# Patient Record
Sex: FEMALE | Race: WHITE | NOT HISPANIC OR LATINO | URBAN - METROPOLITAN AREA
[De-identification: names, ages, dates, MRNs, and addresses within clinical notes are randomized per-mention and may not be internally consistent; named-entity substitution may affect disease eponyms.]

---

## 2019-11-06 PROBLEM — 724538004: Status: ACTIVE | Noted: 2019-11-06

## 2020-07-28 ENCOUNTER — TELEPHONE ENCOUNTER (OUTPATIENT)
Dept: URBAN - METROPOLITAN AREA CLINIC 35 | Facility: CLINIC | Age: 75
End: 2020-07-28

## 2020-07-28 RX ORDER — ESOMEPRAZOLE MAGNESIUM 40 MG/1
TAKE ONE CAPSULE BY MOUTH EVERY DAY CAPSULE, DELAYED RELEASE ORAL DAILY
Qty: 90 | Refills: 0 | OUTPATIENT

## 2020-11-02 ENCOUNTER — TELEPHONE ENCOUNTER (OUTPATIENT)
Dept: URBAN - METROPOLITAN AREA CLINIC 35 | Facility: CLINIC | Age: 75
End: 2020-11-02

## 2020-11-02 RX ORDER — ESOMEPRAZOLE MAGNESIUM 40 MG/1
TAKE ONE CAPSULE BY MOUTH EVERY DAY CAPSULE, DELAYED RELEASE ORAL DAILY
Qty: 90 | Refills: 0 | OUTPATIENT

## 2021-02-05 ENCOUNTER — TELEPHONE ENCOUNTER (OUTPATIENT)
Dept: URBAN - METROPOLITAN AREA CLINIC 35 | Facility: CLINIC | Age: 76
End: 2021-02-05

## 2021-02-09 ENCOUNTER — TELEPHONE ENCOUNTER (OUTPATIENT)
Dept: URBAN - METROPOLITAN AREA CLINIC 35 | Facility: CLINIC | Age: 76
End: 2021-02-09

## 2021-02-15 ENCOUNTER — WEB ENCOUNTER (OUTPATIENT)
Dept: URBAN - METROPOLITAN AREA CLINIC 35 | Facility: CLINIC | Age: 76
End: 2021-02-15

## 2021-02-17 ENCOUNTER — OFFICE VISIT (OUTPATIENT)
Dept: URBAN - METROPOLITAN AREA CLINIC 31 | Facility: CLINIC | Age: 76
End: 2021-02-17

## 2021-02-17 VITALS — WEIGHT: 139 LBS | HEIGHT: 60 IN | BODY MASS INDEX: 27.29 KG/M2

## 2021-02-17 RX ORDER — ESOMEPRAZOLE MAGNESIUM 40 MG/1
TAKE ONE CAPSULE BY MOUTH EVERY DAY CAPSULE, DELAYED RELEASE ORAL DAILY
Qty: 90 | Refills: 0 | Status: ACTIVE | COMMUNITY

## 2021-02-17 RX ORDER — UBIDECARENONE/VIT E ACET 100MG-5
1 CAPSULE WITH A MEAL CAPSULE ORAL ONCE A DAY
Status: ACTIVE | COMMUNITY

## 2021-02-17 RX ORDER — DAPSONE 50 MG/G
GEL TOPICAL ONCE A DAY
Status: ACTIVE | COMMUNITY

## 2021-02-17 RX ORDER — ESOMEPRAZOLE MAGNESIUM 40 MG/1
TAKE ONE CAPSULE BY MOUTH EVERY DAY CAPSULE, DELAYED RELEASE ORAL DAILY
Qty: 90 TABLET | Refills: 4

## 2021-02-17 NOTE — HPI-MIGRATED HPI
;     Heartburn : Patient presents today for a follow-up office visit from 11/06/2019. She is currently taking Esomeprazole 40 mg, daily. Patient states that once in a while she may miss a dose of the Nexium. On the days that she misses a dose of the Nexium, she will experience heartburn. She denies any dysphagia episodes. Only current cough appears to be related to sinus congestion.  Prior reflux cough related to reflux is improved.  She is able to tell the difference.   Patient denies any nausea or vomiting. Patient states that today's visit is really for medication refills. Patient requests refills of Nexium 40 mg, daily, in a 90 day supply, sent to confirmed pharmacy in EMR.;

## 2024-02-12 ENCOUNTER — OV NP (OUTPATIENT)
Dept: URBAN - METROPOLITAN AREA CLINIC 35 | Facility: CLINIC | Age: 79
End: 2024-02-12
Payer: MEDICARE

## 2024-02-12 ENCOUNTER — OV NP (OUTPATIENT)
Dept: URBAN - METROPOLITAN AREA CLINIC 35 | Facility: CLINIC | Age: 79
End: 2024-02-12

## 2024-02-12 VITALS
SYSTOLIC BLOOD PRESSURE: 122 MMHG | BODY MASS INDEX: 24.35 KG/M2 | WEIGHT: 124 LBS | HEIGHT: 60 IN | DIASTOLIC BLOOD PRESSURE: 76 MMHG

## 2024-02-12 DIAGNOSIS — K29.30 CHRONIC SUPERFICIAL GASTRITIS WITHOUT BLEEDING: ICD-10-CM

## 2024-02-12 DIAGNOSIS — Z79.1 NSAID LONG-TERM USE: ICD-10-CM

## 2024-02-12 DIAGNOSIS — R12 HEARTBURN: ICD-10-CM

## 2024-02-12 PROBLEM — 196735001: Status: ACTIVE | Noted: 2024-02-12

## 2024-02-12 PROCEDURE — 99214 OFFICE O/P EST MOD 30 MIN: CPT | Performed by: PHYSICIAN ASSISTANT

## 2024-02-12 RX ORDER — FAMOTIDINE 40 MG/1
1 TABLET AT BEDTIME TABLET, FILM COATED ORAL ONCE A DAY
Status: ACTIVE | COMMUNITY

## 2024-02-12 RX ORDER — DAPSONE 50 MG/G
GEL TOPICAL ONCE A DAY
Status: ON HOLD | COMMUNITY

## 2024-02-12 RX ORDER — ESOMEPRAZOLE MAGNESIUM 40 MG/1
TAKE ONE CAPSULE BY MOUTH EVERY DAY CAPSULE, DELAYED RELEASE ORAL DAILY
Qty: 90 TABLET | Refills: 4 | Status: ACTIVE | COMMUNITY

## 2024-02-12 RX ORDER — PANTOPRAZOLE SODIUM 40 MG/1
1 TABLET TABLET, DELAYED RELEASE ORAL ONCE A DAY
Qty: 30 | Refills: 3 | OUTPATIENT
Start: 2024-02-12

## 2024-02-12 RX ORDER — TRAMADOL HYDROCHLORIDE 50 MG/1
1 TABLET AS NEEDED TABLET, FILM COATED ORAL ONCE A DAY
Status: ACTIVE | COMMUNITY

## 2024-02-12 RX ORDER — TIZANIDINE 2 MG/1
1 TABLET AS NEEDED TABLET ORAL THREE TIMES A DAY
Status: ACTIVE | COMMUNITY

## 2024-02-12 RX ORDER — UBIDECARENONE/VIT E ACET 100MG-5
1 CAPSULE WITH A MEAL CAPSULE ORAL ONCE A DAY
Status: ACTIVE | COMMUNITY

## 2024-02-12 RX ORDER — DAPSONE/NIACINAMIDE 8.5 %-4 %
AS DIRECTED GEL (GRAM) TOPICAL
Status: ACTIVE | COMMUNITY

## 2024-02-12 RX ORDER — GABAPENTIN 100 MG/1
1 CAPSULE CAPSULE ORAL ONCE A DAY
Status: ACTIVE | COMMUNITY

## 2024-02-12 NOTE — HPI-HEARTBURN
79 year old female patient presents today for mid abdominal burning. Onset was about 5 months ago. She admits she had back surgery and has been put on new medications which she believe may have caused her symtoms. She was placed on Diclofenac, and wasn't eating when she has been taking this medication for the past 5 weeks. She admits an EGD in the past, 04/2011 with Dr. Armendariz.  She was told she had chronic inactive gastritis, and at the time had been taking a lot of anti-inflammatories. She has been on Esomeprazole 40mg qAM, and added Famotidine 40mg BID. She has stopped her Diclofenac, but now started Aleve 220mg once every morning and once at night, and has been taking it for years, other than while on Diclofenac.  No nausea, emesis, no blood in stool.

## 2024-03-11 ENCOUNTER — OV EP (OUTPATIENT)
Dept: URBAN - METROPOLITAN AREA CLINIC 35 | Facility: CLINIC | Age: 79
End: 2024-03-11
Payer: MEDICARE

## 2024-03-11 VITALS
HEIGHT: 60 IN | SYSTOLIC BLOOD PRESSURE: 124 MMHG | DIASTOLIC BLOOD PRESSURE: 82 MMHG | WEIGHT: 124 LBS | BODY MASS INDEX: 24.35 KG/M2

## 2024-03-11 DIAGNOSIS — R12 HEARTBURN: ICD-10-CM

## 2024-03-11 DIAGNOSIS — K29.30 CHRONIC SUPERFICIAL GASTRITIS WITHOUT BLEEDING: ICD-10-CM

## 2024-03-11 DIAGNOSIS — F41.9 ANXIETY: ICD-10-CM

## 2024-03-11 DIAGNOSIS — Z79.1 NSAID LONG-TERM USE: ICD-10-CM

## 2024-03-11 PROBLEM — 48694002: Status: ACTIVE | Noted: 2024-03-11

## 2024-03-11 PROCEDURE — 99214 OFFICE O/P EST MOD 30 MIN: CPT | Performed by: PHYSICIAN ASSISTANT

## 2024-03-11 RX ORDER — DAPSONE 50 MG/G
GEL TOPICAL ONCE A DAY
Status: ON HOLD | COMMUNITY

## 2024-03-11 RX ORDER — PANTOPRAZOLE SODIUM 40 MG/1
1 TABLET TABLET, DELAYED RELEASE ORAL ONCE A DAY
Qty: 30 | Refills: 3 | OUTPATIENT

## 2024-03-11 RX ORDER — GABAPENTIN 100 MG/1
1 CAPSULE CAPSULE ORAL ONCE A DAY
Status: ACTIVE | COMMUNITY

## 2024-03-11 RX ORDER — AMITRIPTYLINE HYDROCHLORIDE 10 MG/1
1 TABLET AT BEDTIME TABLET, FILM COATED ORAL ONCE A DAY
Qty: 30 | Refills: 3 | OUTPATIENT
Start: 2024-03-11

## 2024-03-11 RX ORDER — TRAMADOL HYDROCHLORIDE 50 MG/1
1 TABLET AS NEEDED TABLET, FILM COATED ORAL ONCE A DAY
Status: ON HOLD | COMMUNITY

## 2024-03-11 RX ORDER — ESOMEPRAZOLE MAGNESIUM 40 MG/1
TAKE ONE CAPSULE BY MOUTH EVERY DAY CAPSULE, DELAYED RELEASE ORAL DAILY
Qty: 90 TABLET | Refills: 4 | Status: ON HOLD | COMMUNITY

## 2024-03-11 RX ORDER — FAMOTIDINE 40 MG/1
1 TABLET AT BEDTIME TABLET, FILM COATED ORAL ONCE A DAY
Qty: 90 TABLET | Refills: 3 | OUTPATIENT
Start: 2024-03-11

## 2024-03-11 RX ORDER — DAPSONE/NIACINAMIDE 8.5 %-4 %
AS DIRECTED GEL (GRAM) TOPICAL
Status: ACTIVE | COMMUNITY

## 2024-03-11 RX ORDER — FAMOTIDINE 40 MG/1
1 TABLET AT BEDTIME TABLET, FILM COATED ORAL ONCE A DAY
Status: ACTIVE | COMMUNITY

## 2024-03-11 RX ORDER — UBIDECARENONE/VIT E ACET 100MG-5
1 CAPSULE WITH A MEAL CAPSULE ORAL ONCE A DAY
Status: ACTIVE | COMMUNITY

## 2024-03-11 RX ORDER — PANTOPRAZOLE SODIUM 40 MG/1
1 TABLET TABLET, DELAYED RELEASE ORAL ONCE A DAY
Qty: 30 | Refills: 3 | Status: ACTIVE | COMMUNITY
Start: 2024-02-12

## 2024-03-11 RX ORDER — TIZANIDINE 2 MG/1
1 TABLET AS NEEDED TABLET ORAL THREE TIMES A DAY
Status: ON HOLD | COMMUNITY

## 2024-03-11 NOTE — HPI-HEARTBURN
Patient presents today for a follow up of abdominal burning. She admits taking Pantoprazole 40mg and Famotidine 40mg at night. She admits continued episodes since her last visit with some improvement. She admits symptoms are very slight. She admits to belching which is not normal for her. She admits being cautious of what she eats so she feels that might be helping as well.  Last visit: 79 year old female patient presents today for mid abdominal burning. Onset was about 5 months ago. She admits she had back surgery and has been put on new medications which she believe may have caused her symtoms. She was placed on Diclofenac, and wasn't eating when she has been taking this medication for the past 5 weeks. She admits an EGD in the past, 04/2011 with Dr. Armendariz.  She was told she had chronic inactive gastritis, and at the time had been taking a lot of anti-inflammatories. She has been on Esomeprazole 40mg qAM, and added Famotidine 40mg BID. She has stopped her Diclofenac, but now started Aleve 220mg once every morning and once at night, and has been taking it for years, other than while on Diclofenac.  No nausea, emesis, no blood in stool.

## 2024-04-11 ENCOUNTER — OV EP (OUTPATIENT)
Dept: URBAN - METROPOLITAN AREA CLINIC 35 | Facility: CLINIC | Age: 79
End: 2024-04-11

## 2024-04-15 ENCOUNTER — OV EP (OUTPATIENT)
Dept: URBAN - METROPOLITAN AREA CLINIC 35 | Facility: CLINIC | Age: 79
End: 2024-04-15
Payer: MEDICARE

## 2024-04-15 VITALS
DIASTOLIC BLOOD PRESSURE: 80 MMHG | WEIGHT: 124 LBS | BODY MASS INDEX: 24.35 KG/M2 | SYSTOLIC BLOOD PRESSURE: 118 MMHG | HEIGHT: 60 IN

## 2024-04-15 DIAGNOSIS — Z79.1 NSAID LONG-TERM USE: ICD-10-CM

## 2024-04-15 DIAGNOSIS — F41.9 ANXIETY: ICD-10-CM

## 2024-04-15 DIAGNOSIS — K29.30 CHRONIC SUPERFICIAL GASTRITIS WITHOUT BLEEDING: ICD-10-CM

## 2024-04-15 DIAGNOSIS — R12 HEARTBURN: ICD-10-CM

## 2024-04-15 PROCEDURE — 99213 OFFICE O/P EST LOW 20 MIN: CPT | Performed by: PHYSICIAN ASSISTANT

## 2024-04-15 RX ORDER — FAMOTIDINE 40 MG/1
1 TABLET AT BEDTIME TABLET, FILM COATED ORAL ONCE A DAY
Qty: 90 TABLET | Refills: 3 | Status: ACTIVE | COMMUNITY
Start: 2024-03-11

## 2024-04-15 RX ORDER — PANTOPRAZOLE SODIUM 40 MG/1
1 TABLET TABLET, DELAYED RELEASE ORAL ONCE A DAY
Qty: 30 | Refills: 3 | Status: ACTIVE | COMMUNITY

## 2024-04-15 RX ORDER — DAPSONE/NIACINAMIDE 8.5 %-4 %
AS DIRECTED GEL (GRAM) TOPICAL
Status: ACTIVE | COMMUNITY

## 2024-04-15 RX ORDER — FAMOTIDINE 40 MG/1
1 TABLET AT BEDTIME TABLET, FILM COATED ORAL ONCE A DAY
Qty: 90 TABLET | Refills: 3 | OUTPATIENT

## 2024-04-15 RX ORDER — GABAPENTIN 100 MG/1
1 CAPSULE CAPSULE ORAL ONCE A DAY
Status: ACTIVE | COMMUNITY

## 2024-04-15 RX ORDER — PANTOPRAZOLE SODIUM 40 MG/1
1 TABLET TABLET, DELAYED RELEASE ORAL ONCE A DAY
Qty: 30 | Refills: 3 | OUTPATIENT

## 2024-04-15 NOTE — HPI-HEARTBURN
79 year old female patient presents today for a follow up of abdominal burning. She admits continued use of Pantoprazole 40mg daily and Famotidine at night. She admits using Tylenol for pain management. She admits significant improvement in symptoms. She admits eating [place] or [place] will aggravate her symptoms. She finds diet control is her best asset to helping symptoms. She took Amitiptyline once but it didn't make her feel good so she didn't try again.  Last visit: Patient presents today for a follow up of abdominal burning. She admits taking Pantoprazole 40mg and Famotidine 40mg at night. She admits continued episodes since her last visit with some improvement. She admits symptoms are very slight. She admits to belching which is not normal for her. She admits being cautious of what she eats so she feels that might be helping as well.  Last visit: 79 year old female patient presents today for mid abdominal burning. Onset was about 5 months ago. She admits she had back surgery and has been put on new medications which she believe may have caused her symtoms. She was placed on Diclofenac, and wasn't eating when she has been taking this medication for the past 5 weeks. She admits an EGD in the past, 04/2011 with Dr. Armendariz.  She was told she had chronic inactive gastritis, and at the time had been taking a lot of anti-inflammatories. She has been on Esomeprazole 40mg qAM, and added Famotidine 40mg BID. She has stopped her Diclofenac, but now started Aleve 220mg once every morning and once at night, and has been taking it for years, other than while on Diclofenac.  No nausea, emesis, no blood in stool.

## 2024-07-22 ENCOUNTER — OFFICE VISIT (OUTPATIENT)
Dept: URBAN - METROPOLITAN AREA CLINIC 35 | Facility: CLINIC | Age: 79
End: 2024-07-22
Payer: MEDICARE

## 2024-07-22 ENCOUNTER — DASHBOARD ENCOUNTERS (OUTPATIENT)
Age: 79
End: 2024-07-22

## 2024-07-22 VITALS
BODY MASS INDEX: 23.95 KG/M2 | WEIGHT: 122 LBS | HEIGHT: 60 IN | SYSTOLIC BLOOD PRESSURE: 118 MMHG | DIASTOLIC BLOOD PRESSURE: 82 MMHG

## 2024-07-22 DIAGNOSIS — R12 HEARTBURN: ICD-10-CM

## 2024-07-22 PROCEDURE — 99214 OFFICE O/P EST MOD 30 MIN: CPT | Performed by: PHYSICIAN ASSISTANT

## 2024-07-22 RX ORDER — DAPSONE/NIACINAMIDE 8.5 %-4 %
AS DIRECTED GEL (GRAM) TOPICAL
Status: ACTIVE | COMMUNITY

## 2024-07-22 RX ORDER — PANTOPRAZOLE SODIUM 40 MG/1
1 TABLET TABLET, DELAYED RELEASE ORAL ONCE A DAY
Qty: 30 | Refills: 3 | Status: ON HOLD | COMMUNITY

## 2024-07-22 RX ORDER — FAMOTIDINE 40 MG/1
1 TABLET AT BEDTIME TABLET, FILM COATED ORAL ONCE A DAY
Qty: 90 TABLET | Refills: 3 | Status: ACTIVE | COMMUNITY

## 2024-07-22 RX ORDER — FAMOTIDINE 20 MG/1
TAKE 1 TABLET TABLET, FILM COATED ORAL
Qty: 60 TABLET | Refills: 3 | OUTPATIENT
Start: 2024-07-22

## 2024-07-22 RX ORDER — GABAPENTIN 100 MG/1
1 CAPSULE CAPSULE ORAL ONCE A DAY
Status: ACTIVE | COMMUNITY

## 2024-07-22 NOTE — HPI-HEARTBURN
Patient presents today for a follow up of abdominal burning. She denies continued abdominal burning and admits it is under control. She admits continued use of Pantoprazole and admits continued use of Famotidine. She admits she ran out of Pantoprazole. She admits she believes Pantoprazole works better than Famotidine. She admits at night her symptoms are worse when she takes Famotidine.  She tried to taper off the Pantorprazole but was not able to do so. She felt the Famotidine is inconsistent as far as how well it works. Pt admits she is having burning occasionally at night when she lays down.  Last visit: 79 year old female patient presents today for a follow up of abdominal burning. She admits continued use of Pantoprazole 40mg daily and Famotidine at night. She admits using Tylenol for pain management. She admits significant improvement in symptoms. She admits eating [place] or [place] will aggravate her symptoms. She finds diet control is her best asset to helping symptoms. She took Amitriptyline oncebut it didn't make her feel goodso she didn't try again.  Last visit: Patient presents today for a follow up of abdominal burning. She admits taking Pantoprazole 40mg and Famotidine 40mg at night. She admits continued episodes since her last visit with some improvement. She admits symptoms are very slight. She admits to belching which is not normal for her. She admits being cautious of what she eatsso she feels that might be helping as well.  Last visit: 79 year old female patient presents today for mid abdominal burning. Onset was about 5 months ago. She admits she had back surgery and has been put on new medications which she believes may have caused her symptoms. She was placed on Diclofenac, and wasn't eating when she has been taking this medication for the past 5 weeks. She admits an EGD in the past, 04/2011 with Dr. Armendariz.  She was told she had chronic inactive gastritis, and at the time had been taking a lot of anti-inflammatories. She has been on Esomeprazole 40mg qAM, and added Famotidine 40mg BID. She has stopped her Diclofenac, but now started Aleve 220mg once every morning and once at night, and has been taking it for years, other than while on Diclofenac.  No nausea, emesis, no blood in stool.
I personally spent

## 2024-08-16 ENCOUNTER — WEB ENCOUNTER (OUTPATIENT)
Dept: URBAN - METROPOLITAN AREA CLINIC 35 | Facility: CLINIC | Age: 79
End: 2024-08-16

## 2024-08-16 RX ORDER — PANTOPRAZOLE SODIUM 40 MG/1
1 TABLET TABLET, DELAYED RELEASE ORAL ONCE A DAY
Qty: 30 | Refills: 3 | OUTPATIENT
Start: 2024-08-18

## 2024-12-23 ENCOUNTER — WEB ENCOUNTER (OUTPATIENT)
Dept: URBAN - METROPOLITAN AREA CLINIC 35 | Facility: CLINIC | Age: 79
End: 2024-12-23

## 2024-12-23 RX ORDER — PANTOPRAZOLE SODIUM 40 MG/1
1 TABLET TABLET, DELAYED RELEASE ORAL ONCE A DAY
Qty: 90 | Refills: 0
Start: 2024-08-18

## 2024-12-26 ENCOUNTER — WEB ENCOUNTER (OUTPATIENT)
Dept: URBAN - METROPOLITAN AREA CLINIC 35 | Facility: CLINIC | Age: 79
End: 2024-12-26

## 2025-06-16 ENCOUNTER — WEB ENCOUNTER (OUTPATIENT)
Dept: URBAN - METROPOLITAN AREA CLINIC 35 | Facility: CLINIC | Age: 80
End: 2025-06-16

## 2025-06-16 RX ORDER — PANTOPRAZOLE SODIUM 40 MG/1
1 TABLET TABLET, DELAYED RELEASE ORAL ONCE A DAY
Qty: 30 | Refills: 1
Start: 2024-08-18

## 2025-06-20 ENCOUNTER — WEB ENCOUNTER (OUTPATIENT)
Dept: URBAN - METROPOLITAN AREA CLINIC 35 | Facility: CLINIC | Age: 80
End: 2025-06-20

## 2025-08-29 ENCOUNTER — WEB ENCOUNTER (OUTPATIENT)
Dept: URBAN - METROPOLITAN AREA CLINIC 35 | Facility: CLINIC | Age: 80
End: 2025-08-29